# Patient Record
Sex: FEMALE | Race: OTHER | HISPANIC OR LATINO
[De-identification: names, ages, dates, MRNs, and addresses within clinical notes are randomized per-mention and may not be internally consistent; named-entity substitution may affect disease eponyms.]

---

## 2017-02-11 PROBLEM — Z00.00 ENCOUNTER FOR PREVENTIVE HEALTH EXAMINATION: Status: ACTIVE | Noted: 2017-02-11

## 2017-02-14 ENCOUNTER — LABORATORY RESULT (OUTPATIENT)
Age: 32
End: 2017-02-14

## 2017-02-15 ENCOUNTER — APPOINTMENT (OUTPATIENT)
Dept: OBGYN | Facility: CLINIC | Age: 32
End: 2017-02-15

## 2017-02-15 VITALS — DIASTOLIC BLOOD PRESSURE: 80 MMHG | WEIGHT: 146 LBS | SYSTOLIC BLOOD PRESSURE: 110 MMHG

## 2017-02-15 DIAGNOSIS — A63.0 ANOGENITAL (VENEREAL) WARTS: ICD-10-CM

## 2017-02-15 DIAGNOSIS — Z98.890 OTHER SPECIFIED POSTPROCEDURAL STATES: ICD-10-CM

## 2017-02-15 DIAGNOSIS — Z78.9 OTHER SPECIFIED HEALTH STATUS: ICD-10-CM

## 2017-02-15 DIAGNOSIS — Z80.0 FAMILY HISTORY OF MALIGNANT NEOPLASM OF DIGESTIVE ORGANS: ICD-10-CM

## 2017-03-15 ENCOUNTER — APPOINTMENT (OUTPATIENT)
Dept: OBGYN | Facility: CLINIC | Age: 32
End: 2017-03-15

## 2017-03-24 ENCOUNTER — APPOINTMENT (OUTPATIENT)
Dept: ANTEPARTUM | Facility: CLINIC | Age: 32
End: 2017-03-24

## 2017-03-24 ENCOUNTER — ASOB RESULT (OUTPATIENT)
Age: 32
End: 2017-03-24

## 2017-03-28 ENCOUNTER — FORM ENCOUNTER (OUTPATIENT)
Age: 32
End: 2017-03-28

## 2017-03-29 ENCOUNTER — OUTPATIENT (OUTPATIENT)
Dept: OUTPATIENT SERVICES | Facility: HOSPITAL | Age: 32
LOS: 1 days | End: 2017-03-29
Payer: MEDICAID

## 2017-03-29 DIAGNOSIS — Z34.80 ENCOUNTER FOR SUPERVISION OF OTHER NORMAL PREGNANCY, UNSPECIFIED TRIMESTER: ICD-10-CM

## 2017-03-29 LAB
APPEARANCE UR: CLEAR — SIGNIFICANT CHANGE UP
BILIRUB UR-MCNC: NEGATIVE — SIGNIFICANT CHANGE UP
COLOR SPEC: YELLOW — SIGNIFICANT CHANGE UP
DIFF PNL FLD: NEGATIVE — SIGNIFICANT CHANGE UP
GLUCOSE UR QL: NEGATIVE — SIGNIFICANT CHANGE UP
KETONES UR-MCNC: NEGATIVE — SIGNIFICANT CHANGE UP
LEUKOCYTE ESTERASE UR-ACNC: NEGATIVE — SIGNIFICANT CHANGE UP
NITRITE UR-MCNC: NEGATIVE — SIGNIFICANT CHANGE UP
PH UR: 6 — SIGNIFICANT CHANGE UP (ref 4.8–8)
PROT UR-MCNC: NEGATIVE — SIGNIFICANT CHANGE UP
SP GR SPEC: 1.01 — SIGNIFICANT CHANGE UP (ref 1.01–1.02)
UROBILINOGEN FLD QL: NEGATIVE — SIGNIFICANT CHANGE UP

## 2017-03-29 PROCEDURE — 59025 FETAL NON-STRESS TEST: CPT

## 2017-03-29 PROCEDURE — 76775 US EXAM ABDO BACK WALL LIM: CPT | Mod: 26

## 2017-03-29 PROCEDURE — 76775 US EXAM ABDO BACK WALL LIM: CPT

## 2017-03-29 PROCEDURE — 81003 URINALYSIS AUTO W/O SCOPE: CPT

## 2017-03-29 PROCEDURE — G0463: CPT

## 2017-03-29 PROCEDURE — 87086 URINE CULTURE/COLONY COUNT: CPT

## 2017-03-30 LAB
CULTURE RESULTS: NO GROWTH — SIGNIFICANT CHANGE UP
SPECIMEN SOURCE: SIGNIFICANT CHANGE UP

## 2017-04-12 ENCOUNTER — APPOINTMENT (OUTPATIENT)
Dept: OBGYN | Facility: CLINIC | Age: 32
End: 2017-04-12

## 2017-05-11 ENCOUNTER — APPOINTMENT (OUTPATIENT)
Dept: OBGYN | Facility: CLINIC | Age: 32
End: 2017-05-11

## 2017-05-11 VITALS
DIASTOLIC BLOOD PRESSURE: 80 MMHG | HEIGHT: 62 IN | WEIGHT: 160 LBS | SYSTOLIC BLOOD PRESSURE: 120 MMHG | BODY MASS INDEX: 29.44 KG/M2

## 2017-06-01 ENCOUNTER — APPOINTMENT (OUTPATIENT)
Dept: OBGYN | Facility: CLINIC | Age: 32
End: 2017-06-01

## 2017-06-01 VITALS
BODY MASS INDEX: 29.26 KG/M2 | WEIGHT: 159 LBS | HEIGHT: 62 IN | SYSTOLIC BLOOD PRESSURE: 100 MMHG | DIASTOLIC BLOOD PRESSURE: 70 MMHG

## 2017-06-01 DIAGNOSIS — L50.0 ALLERGIC URTICARIA: ICD-10-CM

## 2017-06-22 ENCOUNTER — APPOINTMENT (OUTPATIENT)
Dept: OBGYN | Facility: CLINIC | Age: 32
End: 2017-06-22

## 2017-06-22 VITALS
DIASTOLIC BLOOD PRESSURE: 80 MMHG | HEIGHT: 62 IN | WEIGHT: 165 LBS | SYSTOLIC BLOOD PRESSURE: 120 MMHG | BODY MASS INDEX: 30.36 KG/M2

## 2017-07-05 ENCOUNTER — LABORATORY RESULT (OUTPATIENT)
Age: 32
End: 2017-07-05

## 2017-07-05 ENCOUNTER — APPOINTMENT (OUTPATIENT)
Dept: OBGYN | Facility: CLINIC | Age: 32
End: 2017-07-05

## 2017-07-05 VITALS
SYSTOLIC BLOOD PRESSURE: 110 MMHG | DIASTOLIC BLOOD PRESSURE: 80 MMHG | WEIGHT: 164 LBS | HEIGHT: 62 IN | BODY MASS INDEX: 30.18 KG/M2

## 2017-07-12 ENCOUNTER — APPOINTMENT (OUTPATIENT)
Dept: OBGYN | Facility: CLINIC | Age: 32
End: 2017-07-12

## 2017-07-12 VITALS
HEIGHT: 62 IN | DIASTOLIC BLOOD PRESSURE: 80 MMHG | SYSTOLIC BLOOD PRESSURE: 120 MMHG | BODY MASS INDEX: 30.36 KG/M2 | WEIGHT: 165 LBS

## 2017-07-19 ENCOUNTER — APPOINTMENT (OUTPATIENT)
Dept: OBGYN | Facility: CLINIC | Age: 32
End: 2017-07-19

## 2017-07-19 ENCOUNTER — NON-APPOINTMENT (OUTPATIENT)
Age: 32
End: 2017-07-19

## 2017-07-19 VITALS
BODY MASS INDEX: 30.36 KG/M2 | HEIGHT: 62 IN | DIASTOLIC BLOOD PRESSURE: 80 MMHG | SYSTOLIC BLOOD PRESSURE: 100 MMHG | WEIGHT: 165 LBS

## 2017-07-26 ENCOUNTER — INPATIENT (INPATIENT)
Facility: HOSPITAL | Age: 32
LOS: 2 days | Discharge: HOME | End: 2017-07-29
Attending: OBSTETRICS & GYNECOLOGY | Admitting: OBSTETRICS & GYNECOLOGY
Payer: MEDICAID

## 2017-07-26 ENCOUNTER — APPOINTMENT (OUTPATIENT)
Dept: OBGYN | Facility: CLINIC | Age: 32
End: 2017-07-26

## 2017-07-26 VITALS — WEIGHT: 160.94 LBS | HEIGHT: 63 IN

## 2017-07-26 DIAGNOSIS — O26.899 OTHER SPECIFIED PREGNANCY RELATED CONDITIONS, UNSPECIFIED TRIMESTER: ICD-10-CM

## 2017-07-26 DIAGNOSIS — Z3A.00 WEEKS OF GESTATION OF PREGNANCY NOT SPECIFIED: ICD-10-CM

## 2017-07-26 DIAGNOSIS — Z34.80 ENCOUNTER FOR SUPERVISION OF OTHER NORMAL PREGNANCY, UNSPECIFIED TRIMESTER: ICD-10-CM

## 2017-07-26 LAB
APTT BLD: 27.4 SEC — LOW (ref 27.5–37.4)
BASOPHILS # BLD AUTO: 0.1 K/UL — SIGNIFICANT CHANGE UP (ref 0–0.2)
BASOPHILS NFR BLD AUTO: 0.7 % — SIGNIFICANT CHANGE UP (ref 0–2)
EOSINOPHIL # BLD AUTO: 0.1 K/UL — SIGNIFICANT CHANGE UP (ref 0–0.5)
EOSINOPHIL NFR BLD AUTO: 0.9 % — SIGNIFICANT CHANGE UP (ref 0–6)
FIBRINOGEN PPP-MCNC: 848 MG/DL — HIGH (ref 310–510)
HBV SURFACE AG SERPL QL IA: SIGNIFICANT CHANGE UP
HCT VFR BLD CALC: 36.2 % — SIGNIFICANT CHANGE UP (ref 34.5–45)
HGB BLD-MCNC: 12.6 G/DL — SIGNIFICANT CHANGE UP (ref 11.5–15.5)
HIV 1 & 2 AB SERPL IA.RAPID: SIGNIFICANT CHANGE UP
HIV 1+2 AB+HIV1 P24 AG SERPL QL IA: SIGNIFICANT CHANGE UP
INR BLD: 0.93 RATIO — SIGNIFICANT CHANGE UP (ref 0.88–1.16)
LYMPHOCYTES # BLD AUTO: 1.9 K/UL — SIGNIFICANT CHANGE UP (ref 1–3.3)
LYMPHOCYTES # BLD AUTO: 15.6 % — SIGNIFICANT CHANGE UP (ref 13–44)
MCHC RBC-ENTMCNC: 31.9 PG — SIGNIFICANT CHANGE UP (ref 27–34)
MCHC RBC-ENTMCNC: 34.9 GM/DL — SIGNIFICANT CHANGE UP (ref 32–36)
MCV RBC AUTO: 91.4 FL — SIGNIFICANT CHANGE UP (ref 80–100)
MONOCYTES # BLD AUTO: 1.1 K/UL — HIGH (ref 0–0.9)
MONOCYTES NFR BLD AUTO: 8.8 % — SIGNIFICANT CHANGE UP (ref 2–14)
NEUTROPHILS # BLD AUTO: 9.1 K/UL — HIGH (ref 1.8–7.4)
NEUTROPHILS NFR BLD AUTO: 74 % — SIGNIFICANT CHANGE UP (ref 43–77)
PLATELET # BLD AUTO: 229 K/UL — SIGNIFICANT CHANGE UP (ref 150–400)
PROTHROM AB SERPL-ACNC: 10.1 SEC — SIGNIFICANT CHANGE UP (ref 9.8–12.7)
RBC # BLD: 3.96 M/UL — SIGNIFICANT CHANGE UP (ref 3.8–5.2)
RBC # FLD: 13 % — SIGNIFICANT CHANGE UP (ref 10.3–14.5)
RUBV IGG SER-ACNC: 1.3 INDEX — SIGNIFICANT CHANGE UP
RUBV IGG SER-IMP: POSITIVE — SIGNIFICANT CHANGE UP
T PALLIDUM AB TITR SER: NEGATIVE — SIGNIFICANT CHANGE UP
WBC # BLD: 12.3 K/UL — HIGH (ref 3.8–10.5)
WBC # FLD AUTO: 12.3 K/UL — HIGH (ref 3.8–10.5)

## 2017-07-26 PROCEDURE — 59409 OBSTETRICAL CARE: CPT | Mod: U8,UB

## 2017-07-26 RX ORDER — ACETAMINOPHEN 500 MG
650 TABLET ORAL EVERY 6 HOURS
Qty: 0 | Refills: 0 | Status: DISCONTINUED | OUTPATIENT
Start: 2017-07-26 | End: 2017-07-29

## 2017-07-26 RX ORDER — GENTAMICIN SULFATE 40 MG/ML
80 VIAL (ML) INJECTION ONCE
Qty: 0 | Refills: 0 | Status: COMPLETED | OUTPATIENT
Start: 2017-07-26 | End: 2017-07-26

## 2017-07-26 RX ORDER — OXYTOCIN 10 UNIT/ML
333.33 VIAL (ML) INJECTION
Qty: 20 | Refills: 0 | Status: DISCONTINUED | OUTPATIENT
Start: 2017-07-26 | End: 2017-07-26

## 2017-07-26 RX ORDER — AMPICILLIN TRIHYDRATE 250 MG
2 CAPSULE ORAL ONCE
Qty: 0 | Refills: 0 | Status: COMPLETED | OUTPATIENT
Start: 2017-07-26 | End: 2017-07-26

## 2017-07-26 RX ORDER — GENTAMICIN SULFATE 40 MG/ML
80 VIAL (ML) INJECTION EVERY 8 HOURS
Qty: 0 | Refills: 0 | Status: DISCONTINUED | OUTPATIENT
Start: 2017-07-27 | End: 2017-07-27

## 2017-07-26 RX ORDER — SODIUM CHLORIDE 9 MG/ML
1000 INJECTION, SOLUTION INTRAVENOUS
Qty: 0 | Refills: 0 | Status: DISCONTINUED | OUTPATIENT
Start: 2017-07-26 | End: 2017-07-27

## 2017-07-26 RX ORDER — SODIUM CHLORIDE 9 MG/ML
1000 INJECTION, SOLUTION INTRAVENOUS ONCE
Qty: 0 | Refills: 0 | Status: COMPLETED | OUTPATIENT
Start: 2017-07-26 | End: 2017-07-26

## 2017-07-26 RX ORDER — SODIUM CHLORIDE 9 MG/ML
125 INJECTION, SOLUTION INTRAVENOUS ONCE
Qty: 0 | Refills: 0 | Status: DISCONTINUED | OUTPATIENT
Start: 2017-07-26 | End: 2017-07-26

## 2017-07-26 RX ORDER — CITRIC ACID/SODIUM CITRATE 300-500 MG
15 SOLUTION, ORAL ORAL EVERY 4 HOURS
Qty: 0 | Refills: 0 | Status: DISCONTINUED | OUTPATIENT
Start: 2017-07-26 | End: 2017-07-26

## 2017-07-26 RX ORDER — AMPICILLIN TRIHYDRATE 250 MG
CAPSULE ORAL
Qty: 0 | Refills: 0 | Status: DISCONTINUED | OUTPATIENT
Start: 2017-07-26 | End: 2017-07-27

## 2017-07-26 RX ORDER — AMPICILLIN TRIHYDRATE 250 MG
2 CAPSULE ORAL EVERY 6 HOURS
Qty: 0 | Refills: 0 | Status: DISCONTINUED | OUTPATIENT
Start: 2017-07-27 | End: 2017-07-27

## 2017-07-26 RX ORDER — GENTAMICIN SULFATE 40 MG/ML
VIAL (ML) INJECTION
Qty: 0 | Refills: 0 | Status: DISCONTINUED | OUTPATIENT
Start: 2017-07-26 | End: 2017-07-27

## 2017-07-26 RX ORDER — OXYTOCIN 10 UNIT/ML
2 VIAL (ML) INJECTION
Qty: 30 | Refills: 0 | Status: DISCONTINUED | OUTPATIENT
Start: 2017-07-26 | End: 2017-07-27

## 2017-07-26 RX ADMIN — SODIUM CHLORIDE 125 MILLILITER(S): 9 INJECTION, SOLUTION INTRAVENOUS at 12:01

## 2017-07-26 RX ADMIN — SODIUM CHLORIDE 1000 MILLILITER(S): 9 INJECTION, SOLUTION INTRAVENOUS at 20:15

## 2017-07-26 RX ADMIN — Medication 200 MILLIGRAM(S): at 20:50

## 2017-07-26 RX ADMIN — SODIUM CHLORIDE 2000 MILLILITER(S): 9 INJECTION, SOLUTION INTRAVENOUS at 10:46

## 2017-07-26 RX ADMIN — Medication 216 GRAM(S): at 20:15

## 2017-07-26 RX ADMIN — Medication 2 MILLIUNIT(S)/MIN: at 17:59

## 2017-07-26 RX ADMIN — SODIUM CHLORIDE 125 MILLILITER(S): 9 INJECTION, SOLUTION INTRAVENOUS at 02:11

## 2017-07-26 RX ADMIN — Medication 650 MILLIGRAM(S): at 20:15

## 2017-07-26 NOTE — PATIENT PROFILE OB - AS DELIV COMPLICATIONS OB
premature rupture of membranes prior to labor premature rupture of membranes prior to labor/maternal fever

## 2017-07-26 NOTE — PATIENT PROFILE OB - ABILITY TO HEAR (WITH HEARING AID OR HEARING APPLIANCE IF NORMALLY USED):
"    [facility] [place] - OB/ GYN  [address]  [address]  [place] [address]  Phone: [phone]                  Elmo Law   3/7/2017 3:40 PM   Office Visit    Description:  Female : 1983   Provider:  Ross Raymundo MD   Department:  [facility] [place] - OB/ GYN           Reason for Visit     Gynecologic Exam                To Do List           Goals (5 Years of Data)     None      [facility]     [facility] -  Assistance  Registered nurses in the [facility] provide clinical advisement, health education, appointment booking, and other advisory services.  Call for this free service at [phone].             Medications           Message regarding Medications     Verify the changes and/or additions to your medication regime listed below are the same as discussed with your clinician today.  If any of these changes or additions are incorrect, please notify your healthcare provider.             Verify that the below list of medications is an accurate representation of the medications you are currently taking.  If none reported, the list may be blank. If incorrect, please contact your healthcare provider. Carry this list with you in case of emergency.           Current Medications     fluticasone (FLONASE) 50 mcg/actuation nasal spray One to two sprays each nostril daily    loratadine (CLARITIN) 10 mg tablet Take 1 tablet (10 mg total) by mouth once daily.    norgestimate-ethinyl estradiol (ORTHO TRI-CYCLEN LO, 28,) 0.18/0.215 mg/ 0.25 mg-25 mcg tablet Take 1 tablet by mouth once daily.           Clinical Reference Information           Your Vitals Were     BP Temp Height    122/62 (BP Location: Right arm, Patient Position: Sitting, BP Method: Manual) 98.6 °F (37 °C) (Oral) 5' 3" (1.6 m)    Weight Last Period BMI    54.4 kg (119 lb 14.9 oz) 2017 (Exact Date) 21.24 kg/m2      Blood Pressure          Most Recent Value    BP  122/62      Allergies as of 3/7/2017     " No Known Allergies      Immunizations Administered on Date of Encounter - 3/7/2017     None      Language Assistance Services     ATTENTION: Language assistance services are available, free of charge. Please call [phone].      ATENCIÓN: Si obed najera, tiene a sood disposición servicios gratuitos de asistencia lingüística. Llame al [phone].     CHÚ Ý: N?u b?n nói Ti?ng Vi?t, có các d?ch v? h? tr? ngôn ng? mi?n phí dành cho b?n. G?i s? [phone].         [facility] - OB/ GYN complies with applicable Federal civil rights laws and does not discriminate on the basis of race, color, national origin, age, disability, or sex.         Adequate: hears normal conversation without difficulty

## 2017-07-27 DIAGNOSIS — R26.81 UNSTEADINESS ON FEET: ICD-10-CM

## 2017-07-27 LAB
BASOPHILS # BLD AUTO: 0.1 K/UL — SIGNIFICANT CHANGE UP (ref 0–0.2)
BASOPHILS NFR BLD AUTO: 0.5 % — SIGNIFICANT CHANGE UP (ref 0–2)
EOSINOPHIL # BLD AUTO: 0.2 K/UL — SIGNIFICANT CHANGE UP (ref 0–0.5)
EOSINOPHIL NFR BLD AUTO: 0.9 % — SIGNIFICANT CHANGE UP (ref 0–6)
HCT VFR BLD CALC: 32.1 % — LOW (ref 34.5–45)
HGB BLD-MCNC: 10.7 G/DL — LOW (ref 11.5–15.5)
LYMPHOCYTES # BLD AUTO: 1.9 K/UL — SIGNIFICANT CHANGE UP (ref 1–3.3)
LYMPHOCYTES # BLD AUTO: 10.3 % — LOW (ref 13–44)
MCHC RBC-ENTMCNC: 31.3 PG — SIGNIFICANT CHANGE UP (ref 27–34)
MCHC RBC-ENTMCNC: 33.4 GM/DL — SIGNIFICANT CHANGE UP (ref 32–36)
MCV RBC AUTO: 93.5 FL — SIGNIFICANT CHANGE UP (ref 80–100)
MONOCYTES # BLD AUTO: 1.4 K/UL — HIGH (ref 0–0.9)
MONOCYTES NFR BLD AUTO: 7.9 % — SIGNIFICANT CHANGE UP (ref 2–14)
NEUTROPHILS # BLD AUTO: 14.6 K/UL — HIGH (ref 1.8–7.4)
NEUTROPHILS NFR BLD AUTO: 80.4 % — HIGH (ref 43–77)
PLATELET # BLD AUTO: 188 K/UL — SIGNIFICANT CHANGE UP (ref 150–400)
RBC # BLD: 3.43 M/UL — LOW (ref 3.8–5.2)
RBC # FLD: 13.2 % — SIGNIFICANT CHANGE UP (ref 10.3–14.5)
WBC # BLD: 18.2 K/UL — HIGH (ref 3.8–10.5)
WBC # FLD AUTO: 18.2 K/UL — HIGH (ref 3.8–10.5)

## 2017-07-27 PROCEDURE — 72190 X-RAY EXAM OF PELVIS: CPT | Mod: 26

## 2017-07-27 RX ORDER — TETANUS TOXOID, REDUCED DIPHTHERIA TOXOID AND ACELLULAR PERTUSSIS VACCINE, ADSORBED 5; 2.5; 8; 8; 2.5 [IU]/.5ML; [IU]/.5ML; UG/.5ML; UG/.5ML; UG/.5ML
0.5 SUSPENSION INTRAMUSCULAR ONCE
Qty: 0 | Refills: 0 | Status: DISCONTINUED | OUTPATIENT
Start: 2017-07-27 | End: 2017-07-29

## 2017-07-27 RX ORDER — AER TRAVELER 0.5 G/1
1 SOLUTION RECTAL; TOPICAL EVERY 4 HOURS
Qty: 0 | Refills: 0 | Status: DISCONTINUED | OUTPATIENT
Start: 2017-07-27 | End: 2017-07-29

## 2017-07-27 RX ORDER — PRAMOXINE HYDROCHLORIDE 150 MG/15G
1 AEROSOL, FOAM RECTAL EVERY 4 HOURS
Qty: 0 | Refills: 0 | Status: DISCONTINUED | OUTPATIENT
Start: 2017-07-27 | End: 2017-07-29

## 2017-07-27 RX ORDER — LANOLIN
1 OINTMENT (GRAM) TOPICAL EVERY 6 HOURS
Qty: 0 | Refills: 0 | Status: DISCONTINUED | OUTPATIENT
Start: 2017-07-27 | End: 2017-07-29

## 2017-07-27 RX ORDER — OXYTOCIN 10 UNIT/ML
41.67 VIAL (ML) INJECTION
Qty: 20 | Refills: 0 | Status: DISCONTINUED | OUTPATIENT
Start: 2017-07-27 | End: 2017-07-29

## 2017-07-27 RX ORDER — OXYCODONE AND ACETAMINOPHEN 5; 325 MG/1; MG/1
2 TABLET ORAL EVERY 6 HOURS
Qty: 0 | Refills: 0 | Status: DISCONTINUED | OUTPATIENT
Start: 2017-07-27 | End: 2017-07-29

## 2017-07-27 RX ORDER — DIBUCAINE 1 %
1 OINTMENT (GRAM) RECTAL EVERY 4 HOURS
Qty: 0 | Refills: 0 | Status: DISCONTINUED | OUTPATIENT
Start: 2017-07-27 | End: 2017-07-29

## 2017-07-27 RX ORDER — HYDROCORTISONE 1 %
1 OINTMENT (GRAM) TOPICAL EVERY 4 HOURS
Qty: 0 | Refills: 0 | Status: DISCONTINUED | OUTPATIENT
Start: 2017-07-27 | End: 2017-07-29

## 2017-07-27 RX ORDER — GLYCERIN ADULT
1 SUPPOSITORY, RECTAL RECTAL AT BEDTIME
Qty: 0 | Refills: 0 | Status: DISCONTINUED | OUTPATIENT
Start: 2017-07-27 | End: 2017-07-29

## 2017-07-27 RX ORDER — BENZOCAINE 10 %
1 GEL (GRAM) MUCOUS MEMBRANE EVERY 6 HOURS
Qty: 0 | Refills: 0 | Status: DISCONTINUED | OUTPATIENT
Start: 2017-07-27 | End: 2017-07-29

## 2017-07-27 RX ORDER — OXYCODONE AND ACETAMINOPHEN 5; 325 MG/1; MG/1
1 TABLET ORAL
Qty: 0 | Refills: 0 | Status: DISCONTINUED | OUTPATIENT
Start: 2017-07-27 | End: 2017-07-29

## 2017-07-27 RX ORDER — SIMETHICONE 80 MG/1
80 TABLET, CHEWABLE ORAL EVERY 6 HOURS
Qty: 0 | Refills: 0 | Status: DISCONTINUED | OUTPATIENT
Start: 2017-07-27 | End: 2017-07-29

## 2017-07-27 RX ORDER — SODIUM CHLORIDE 9 MG/ML
3 INJECTION INTRAMUSCULAR; INTRAVENOUS; SUBCUTANEOUS EVERY 8 HOURS
Qty: 0 | Refills: 0 | Status: DISCONTINUED | OUTPATIENT
Start: 2017-07-27 | End: 2017-07-29

## 2017-07-27 RX ORDER — MAGNESIUM HYDROXIDE 400 MG/1
30 TABLET, CHEWABLE ORAL
Qty: 0 | Refills: 0 | Status: DISCONTINUED | OUTPATIENT
Start: 2017-07-27 | End: 2017-07-29

## 2017-07-27 RX ORDER — DOCUSATE SODIUM 100 MG
100 CAPSULE ORAL
Qty: 0 | Refills: 0 | Status: DISCONTINUED | OUTPATIENT
Start: 2017-07-27 | End: 2017-07-29

## 2017-07-27 RX ORDER — IBUPROFEN 200 MG
600 TABLET ORAL EVERY 6 HOURS
Qty: 0 | Refills: 0 | Status: DISCONTINUED | OUTPATIENT
Start: 2017-07-27 | End: 2017-07-29

## 2017-07-27 RX ORDER — ACETAMINOPHEN 500 MG
650 TABLET ORAL EVERY 6 HOURS
Qty: 0 | Refills: 0 | Status: DISCONTINUED | OUTPATIENT
Start: 2017-07-27 | End: 2017-07-29

## 2017-07-27 RX ORDER — DIPHENHYDRAMINE HCL 50 MG
25 CAPSULE ORAL EVERY 6 HOURS
Qty: 0 | Refills: 0 | Status: DISCONTINUED | OUTPATIENT
Start: 2017-07-27 | End: 2017-07-29

## 2017-07-27 RX ADMIN — Medication 600 MILLIGRAM(S): at 08:44

## 2017-07-27 RX ADMIN — Medication 600 MILLIGRAM(S): at 17:07

## 2017-07-27 RX ADMIN — Medication 125 MILLIUNIT(S)/MIN: at 02:18

## 2017-07-27 RX ADMIN — Medication 600 MILLIGRAM(S): at 17:30

## 2017-07-27 RX ADMIN — SODIUM CHLORIDE 3 MILLILITER(S): 9 INJECTION INTRAMUSCULAR; INTRAVENOUS; SUBCUTANEOUS at 14:17

## 2017-07-27 RX ADMIN — SODIUM CHLORIDE 3 MILLILITER(S): 9 INJECTION INTRAMUSCULAR; INTRAVENOUS; SUBCUTANEOUS at 06:29

## 2017-07-27 RX ADMIN — Medication 1 TABLET(S): at 12:17

## 2017-07-27 RX ADMIN — Medication 200 MILLIGRAM(S): at 05:30

## 2017-07-27 RX ADMIN — Medication 100 MILLIGRAM(S): at 12:17

## 2017-07-27 RX ADMIN — SODIUM CHLORIDE 3 MILLILITER(S): 9 INJECTION INTRAMUSCULAR; INTRAVENOUS; SUBCUTANEOUS at 22:00

## 2017-07-27 RX ADMIN — Medication 650 MILLIGRAM(S): at 00:19

## 2017-07-27 RX ADMIN — Medication 216 GRAM(S): at 08:42

## 2017-07-27 RX ADMIN — Medication 600 MILLIGRAM(S): at 09:15

## 2017-07-27 RX ADMIN — Medication 216 GRAM(S): at 02:17

## 2017-07-27 NOTE — PROGRESS NOTE ADULT - ASSESSMENT
A:  31 y/o  PPD 0 s/p vaginal delivery, c/b PROM > 24 hours, prolonged labor, with post partum fever, now with unsteady gait  #Postpartum fever    - Ampicillin/Gentamcyin one more dose this am, d/c abx afterwards   - f.u cbc for wbc    - q4h vital signs for temp    - tylenol prn for fever    #Unstead gait   - Pelvic x-ray to rule out pubic symphisis diastasis, or fracture   - ortho consult as needed   - physical therapy consult, for assistive equipment as needed    #vaginal delivery    - Continue post partum care   - Encourage breastfeeding   - Oob to chair, with assistance   - shower daily     d/w Dr. Waterman

## 2017-07-27 NOTE — PROGRESS NOTE ADULT - SUBJECTIVE AND OBJECTIVE BOX
PPD 0  s/p vaginal delivery. Patient seen at bedside, reports 8/10 left lower back and left hip/pelvic pain. She describes the pain as a dull, ache that worsens with movement of left lower extremity. She states that the pain also started soon after her delivery. Subsequently, she states that she is not able to walk, and baby is not at bedside. Patient is voiding without issues, passing gas but has not had a BM, tolerating regular diet. She denies: fever chills, N/V, CP,  heavier vaginal bleeding, SOB, cough, previous history of venous thromboembolism and/or hematological hypercoagulable states.     O: General Survey: a&ox3 and able to answer questions fully. She is lying down on her hospital bed, and not in acute distress  Vital Signs Last 24 Hrs  T(C): 36.4 (2017 07:30), Max: 37 (2017 01:31)  T(F): 97.5 (2017 07:30), Max: 98.6 (2017 01:31)  HR: 80 (2017 07:30) (77 - 101)  BP: 105/60 (2017 07:30) (92/51 - 119/60)  BP(mean): --  RR: 14 (2017 07:30) (14 - 18)  SpO2: 98% (2017 07:30) (95% - 98%)  CV: S1/S2 intact, no murmurs, gallops or friction rubs  Pulm: CTA bilaterally, no rales, rhonchi or wheezing   Abdominal: Fundus was able to be palpated below the level of umbilicus, + BS were present in all four quadrants, and was non-tender to palpation.   GYN: lochia wnl  MSK: left lower extremity has decreased active and passive ROM especially on flexion and extension at the level of the hip. In addition, left hip unable to lift against gravity.  On the contralateral side, the right hip has full ROM, and 5/5 strength. There is a + straight leg raise on left side, while negative on the contralateral  right side. 2+DTR present bilaterally in both lower extremities. Pulses are intact 2+ bilaterally at DP, TP, and popliteal.                           12.6   12.3  )-----------( 229      ( 2017 02:14 )             36.2       RADIOLOGY PENDING  PPD 0  s/p vaginal delivery. Patient seen at bedside, reports 8/10 left lower back and left hip/pelvic pain. She describes the pain as a dull, ache that worsens with movement of left lower extremity. She states that the pain also started soon after her delivery. Subsequently, she states that she is not able to walk, and baby is not at bedside. Patient is voiding without issues, passing gas but has not had a BM, tolerating regular diet. She denies: fever chills, N/V, CP,  heavier vaginal bleeding, SOB, cough, previous history of venous thromboembolism and/or hematological hypercoagulable states.     O: General Survey: a&ox3 and able to answer questions fully. She is lying down on her hospital bed, and not in acute distress  Vital Signs Last 24 Hrs  T(C): 36.4 (2017 07:30), Max: 37 (2017 01:31)  T(F): 97.5 (2017 07:30), Max: 98.6 (2017 01:31)  HR: 80 (2017 07:30) (77 - 101)  BP: 105/60 (2017 07:30) (92/51 - 119/60)  RR: 14 (2017 07:30) (14 - 18)  SpO2: 98% (2017 07:30) (95% - 98%)    CV: S1/S2 intact, no murmurs, gallops or friction rubs  Pulm: CTA bilaterally, no rales, rhonchi or wheezing   Abdominal: Fundus was able to be palpated below the level of umbilicus, + BS were present in all four quadrants, and was non-tender to palpation.   GYN: lochia wnl  MSK: left lower extremity has decreased active and passive ROM especially on flexion and extension at the level of the hip. In addition, left hip strength 2/5.  On the contralateral side, the right hip has full ROM, and 5/5 strength. There is a + straight leg raise on left side, while negative on the contralateral  right side. 2+DTR present bilaterally in both lower extremities. Pulses are intact 2+ bilaterally at DP, TP, and popliteal.                           12.6   12.3  )-----------( 229      ( 2017 02:14 )             36.2       RADIOLOGY PENDING

## 2017-07-27 NOTE — CHART NOTE - NSCHARTNOTEFT_GEN_A_CORE
Patient seen at bedside, states the pain is better this afternoon, states she feels the pain is related to her episiotomy/repair site. Feels relief with dermoplast    Pelvic x-ray findings:    PROCEDURE DATE:  07/27/2017      INTERPRETATION:  CLINICAL STATEMENT: Status post vaginal delivery. Rule   out pubic symphysis diastasis    TECHNIQUE: AP view of the pelvis.    COMPARISON: None.    FINDINGS:  There is no acute fracture or dislocation. The pubic symphysis measures 4 mm in width    IMPRESSION:  No evidence of pubic symphysis diastasis.     REMEDIOS ANDERSON M.D., ATTENDING RADIOLOGIST  This document has been electronically signed. Jul 27 2017 10:56AM      will continue to observe gait, and will follow up as needed.

## 2017-07-28 ENCOUNTER — TRANSCRIPTION ENCOUNTER (OUTPATIENT)
Age: 32
End: 2017-07-28

## 2017-07-28 RX ORDER — IBUPROFEN 200 MG
1 TABLET ORAL
Qty: 30 | Refills: 0 | OUTPATIENT
Start: 2017-07-28

## 2017-07-28 RX ORDER — DOCUSATE SODIUM 100 MG
1 CAPSULE ORAL
Qty: 30 | Refills: 0 | OUTPATIENT
Start: 2017-07-28

## 2017-07-28 RX ADMIN — Medication 600 MILLIGRAM(S): at 21:00

## 2017-07-28 RX ADMIN — Medication 600 MILLIGRAM(S): at 14:48

## 2017-07-28 RX ADMIN — Medication 600 MILLIGRAM(S): at 04:08

## 2017-07-28 RX ADMIN — SODIUM CHLORIDE 3 MILLILITER(S): 9 INJECTION INTRAMUSCULAR; INTRAVENOUS; SUBCUTANEOUS at 14:47

## 2017-07-28 RX ADMIN — Medication 600 MILLIGRAM(S): at 04:40

## 2017-07-28 RX ADMIN — SODIUM CHLORIDE 3 MILLILITER(S): 9 INJECTION INTRAMUSCULAR; INTRAVENOUS; SUBCUTANEOUS at 06:43

## 2017-07-28 RX ADMIN — Medication 600 MILLIGRAM(S): at 15:30

## 2017-07-28 RX ADMIN — Medication 1 TABLET(S): at 11:49

## 2017-07-28 RX ADMIN — SODIUM CHLORIDE 3 MILLILITER(S): 9 INJECTION INTRAMUSCULAR; INTRAVENOUS; SUBCUTANEOUS at 22:47

## 2017-07-28 RX ADMIN — Medication 600 MILLIGRAM(S): at 20:05

## 2017-07-28 NOTE — PROGRESS NOTE ADULT - ASSESSMENT
A/P:  Postpartum day one in stable condition, no evidence of pubic symphysis diastasis, x-ray within normal limits.  -Continue pain management  -Encourage OOB, breast feeding and ambulation  -Continue current care  -Plan for discharge tomorrow.     Attending aware

## 2017-07-28 NOTE — DISCHARGE NOTE OB - CARE PLAN
Principal Discharge DX:	Vaginal delivery  Goal:	Pain management and breastfeeding  Instructions for follow-up, activity and diet:	Continue breastfeeding.  Motrin as needed for pain.  Nothing per vagina x 6 weeks - no sex, tampons, douching, tub baths, etc.  Follow up in office in 5-6 weeks for check up Principal Discharge DX:	Vaginal delivery  Goal:	ob check in 4-6weeks  Instructions for follow-up, activity and diet:	no sex nothing in vagina no heavy lifting no pushing no straining no strenuous activities  pain medication as needed; stool softener; dulcolax as needed if constipated  walk for exercise: helps recovery   continue prenatal vitamins daily especially whole course of breastfeeding  see your OB in the office for follow up post partum check 4-5weeks call the office to set up an appointment

## 2017-07-28 NOTE — DISCHARGE NOTE OB - PLAN OF CARE
Pain management and breastfeeding Continue breastfeeding.  Motrin as needed for pain.  Nothing per vagina x 6 weeks - no sex, tampons, douching, tub baths, etc.  Follow up in office in 5-6 weeks for check up ob check in 4-6weeks no sex nothing in vagina no heavy lifting no pushing no straining no strenuous activities  pain medication as needed; stool softener; dulcolax as needed if constipated  walk for exercise: helps recovery   continue prenatal vitamins daily especially whole course of breastfeeding  see your OB in the office for follow up post partum check 4-5weeks call the office to set up an appointment

## 2017-07-28 NOTE — DISCHARGE NOTE OB - PATIENT PORTAL LINK FT
“You can access the FollowHealth Patient Portal, offered by [facility], by registering with the following website: [email or web address]”

## 2017-07-28 NOTE — DISCHARGE NOTE OB - MEDICATION SUMMARY - MEDICATIONS TO TAKE
I will START or STAY ON the medications listed below when I get home from the hospital:    ibuprofen 600 mg oral tablet  -- 1 tab(s) by mouth every 6 hours  -- Do not take this drug if you are pregnant.  It is very important that you take or use this exactly as directed.  Do not skip doses or discontinue unless directed by your doctor.  May cause drowsiness or dizziness.  Obtain medical advice before taking any non-prescription drugs as some may affect the action of this medication.  Take with food or milk.    -- Indication: For as needed for pain    Prenatal Multivitamins with Folic Acid 1 mg oral tablet  -- 1 tab(s) by mouth once a day  -- Indication: For breastfeeding    Colace sodium 100 mg oral capsule  -- 1 cap(s) by mouth 2 times a day  -- Medication should be taken with plenty of water.    -- Indication: For Stool softener/constipation

## 2017-07-28 NOTE — PROGRESS NOTE ADULT - SUBJECTIVE AND OBJECTIVE BOX
Patient seen resting comfortably.  No current complaints. States that she no longer has instability when walking, and reports pain is better today compared to yesterday. Denies HA, CP, SOB, N/V/D, dizziness, palpitations, worsening abdominal pain, worsening vaginal bleeding, or any other concerns.  Ambulating and voiding without difficulty.  Passing flatus and tolerating regular diet. Baby is in level II nursery, attempting to use breast pump.     Vital Signs Last 24 Hrs  T(C): 36.6 (28 Jul 2017 06:45), Max: 36.9 (27 Jul 2017 20:04)  T(F): 97.8 (28 Jul 2017 06:45), Max: 98.4 (27 Jul 2017 20:04)  HR: 76 (28 Jul 2017 06:45) (76 - 81)  BP: 105/60 (28 Jul 2017 06:45) (99/58 - 105/60)  BP(mean): --  RR: 16 (28 Jul 2017 06:45) (16 - 16)  SpO2: 98% (28 Jul 2017 06:45) (98% - 99%)    Physical Exam:     Gen: A&Ox 3, NAD  Chest: CTABL  Cardiac: S1+S2+ RRR  Breast: Soft, nontender, nonengorged  Abdomen: Soft, nontender, Fundus firm below umbilicus, +BS  Gyn: Mild lochia, intact/repaired  Extremities: Nontender, DTRS 2+, no worsening edema                          10.7   18.2  )-----------( 188      ( 27 Jul 2017 18:11 )             32.1

## 2017-07-28 NOTE — DISCHARGE NOTE OB - CARE PROVIDER_API CALL
Gil Waterman), Obstetrics and Gynecology  [address] [place], [place] [address]  Phone: [phone]  Fax: [phone]

## 2017-07-28 NOTE — DISCHARGE NOTE OB - MATERIALS PROVIDED
Birth Certificate Instructions/Back To Sleep Handout/Vaccinations/Shaken Baby Prevention Handout/Guide to Postpartum Care/Breastfeeding Log/  Immunization Record/Breastfeeding Guide and Packet/[facility] Screening Program/Breastfeeding Mother’s Support Group Information

## 2017-07-28 NOTE — DISCHARGE NOTE OB - HOSPITAL COURSE
Patient is a 32 year old  at 37w5d who presented with ruptured membranes. S/p  and uncomplicated postpartum care.

## 2017-07-29 VITALS
RESPIRATION RATE: 15 BRPM | OXYGEN SATURATION: 100 % | HEART RATE: 93 BPM | DIASTOLIC BLOOD PRESSURE: 62 MMHG | TEMPERATURE: 98 F | SYSTOLIC BLOOD PRESSURE: 90 MMHG

## 2017-07-29 PROCEDURE — 72190 X-RAY EXAM OF PELVIS: CPT

## 2017-07-29 PROCEDURE — 87389 HIV-1 AG W/HIV-1&-2 AB AG IA: CPT

## 2017-07-29 PROCEDURE — 85730 THROMBOPLASTIN TIME PARTIAL: CPT

## 2017-07-29 PROCEDURE — 86703 HIV-1/HIV-2 1 RESULT ANTBDY: CPT

## 2017-07-29 PROCEDURE — 85384 FIBRINOGEN ACTIVITY: CPT

## 2017-07-29 PROCEDURE — 86780 TREPONEMA PALLIDUM: CPT

## 2017-07-29 PROCEDURE — 86850 RBC ANTIBODY SCREEN: CPT

## 2017-07-29 PROCEDURE — 59025 FETAL NON-STRESS TEST: CPT

## 2017-07-29 PROCEDURE — 36415 COLL VENOUS BLD VENIPUNCTURE: CPT

## 2017-07-29 PROCEDURE — 86900 BLOOD TYPING SEROLOGIC ABO: CPT

## 2017-07-29 PROCEDURE — 59050 FETAL MONITOR W/REPORT: CPT

## 2017-07-29 PROCEDURE — G0463: CPT

## 2017-07-29 PROCEDURE — 86762 RUBELLA ANTIBODY: CPT

## 2017-07-29 PROCEDURE — 87340 HEPATITIS B SURFACE AG IA: CPT

## 2017-07-29 PROCEDURE — 86901 BLOOD TYPING SEROLOGIC RH(D): CPT

## 2017-07-29 PROCEDURE — 85610 PROTHROMBIN TIME: CPT

## 2017-07-29 PROCEDURE — 85027 COMPLETE CBC AUTOMATED: CPT

## 2017-07-29 RX ADMIN — Medication 600 MILLIGRAM(S): at 07:50

## 2017-07-29 RX ADMIN — Medication 600 MILLIGRAM(S): at 06:52

## 2017-07-29 RX ADMIN — OXYCODONE AND ACETAMINOPHEN 1 TABLET(S): 5; 325 TABLET ORAL at 00:57

## 2017-07-29 RX ADMIN — SODIUM CHLORIDE 3 MILLILITER(S): 9 INJECTION INTRAMUSCULAR; INTRAVENOUS; SUBCUTANEOUS at 06:39

## 2017-07-29 RX ADMIN — OXYCODONE AND ACETAMINOPHEN 1 TABLET(S): 5; 325 TABLET ORAL at 01:53

## 2017-07-29 NOTE — PROGRESS NOTE ADULT - SUBJECTIVE AND OBJECTIVE BOX
PA NOTE:  PPD#2 FtNSVD   at bedside  doing well    Patient seen at bedside resting comfortably offers no current complaints.  Ambulating and voiding without difficulty.  Passing flatus and tolerating regular diet.  both breast/bottle feeding.  Denies HA, CP, SOB, N/V/D, dizziness, palpitations, worsening abdominal pain, worsening vaginal bleeding, or any other concerns.      Vital Signs Last 24 Hrs  T(C): 36.8 (2017 04:17), Max: 36.8 (2017 04:17)  T(F): 98.3 (2017 04:17), Max: 98.3 (2017 04:17)  HR: 93 (2017 04:17) (71 - 96)  BP: 90/62 (2017 04:17) (90/62 - 128/83)  BP(mean): --  RR: 15 (2017 04:17) (15 - 16)  SpO2: 100% (2017 04:17) (99% - 100%)    Physical Exam:     Gen: A&Ox 3, NAD  Chest: CTA B/L  Cardiac: S1+S2; RRR  Breast: Soft, nontender, nonengorged  Abdomen: +BS; Soft, nontender, ND; Fundus firm below umbilicus  Gyn: Min lochia  Ext: Nontender, DTRS 2+, no worsening edema     CBC Full  -  ( 2017 18:11 )  WBC Count : 18.2 K/uL  Hemoglobin : 10.7 g/dL  Hematocrit : 32.1 %  Platelet Count - Automated : 188 K/uL  Mean Cell Volume : 93.5 fl  Mean Cell Hemoglobin : 31.3 pg  Mean Cell Hemoglobin Concentration : 33.4 gm/dL  Auto Neutrophil # : 14.6 K/uL  Auto Lymphocyte # : 1.9 K/uL  Auto Monocyte # : 1.4 K/uL  Auto Eosinophil # : 0.2 K/uL  Auto Basophil # : 0.1 K/uL  Auto Neutrophil % : 80.4 %  Auto Lymphocyte % : 10.3 %  Auto Monocyte % : 7.9 %  Auto Eosinophil % : 0.9 %  Auto Basophil % : 0.5 %      PA NOTE:  PPD#2 FtNSVD   at bedside  doing well  -dc home today dw dr fowler  -erx sent  -ob check in 4-5weeks

## 2017-07-29 NOTE — PROGRESS NOTE ADULT - PROBLEM SELECTOR PLAN 1
PA NOTE:  PPD#2 FtNSVD   at bedside  doing well  -dc home today dw dr fowelr  -erx sent  -ob check in 4-5weeks

## 2017-07-29 NOTE — PROGRESS NOTE ADULT - ASSESSMENT
PA NOTE:  PPD#2 FtNSVD   at bedside  doing well  -dc home today dw dr fowler  -erx sent  -ob check in 4-5weeks

## 2017-08-01 DIAGNOSIS — R26.81 UNSTEADINESS ON FEET: ICD-10-CM

## 2017-08-01 DIAGNOSIS — O42.92 FULL-TERM PREMATURE RUPTURE OF MEMBRANES, UNSPECIFIED AS TO LENGTH OF TIME BETWEEN RUPTURE AND ONSET OF LABOR: ICD-10-CM

## 2017-08-01 DIAGNOSIS — Z3A.37 37 WEEKS GESTATION OF PREGNANCY: ICD-10-CM

## 2017-08-01 DIAGNOSIS — Z28.21 IMMUNIZATION NOT CARRIED OUT BECAUSE OF PATIENT REFUSAL: ICD-10-CM

## 2017-09-01 ENCOUNTER — APPOINTMENT (OUTPATIENT)
Dept: OBGYN | Facility: CLINIC | Age: 32
End: 2017-09-01
Payer: MEDICAID

## 2017-09-01 VITALS
WEIGHT: 146 LBS | SYSTOLIC BLOOD PRESSURE: 110 MMHG | DIASTOLIC BLOOD PRESSURE: 80 MMHG | BODY MASS INDEX: 26.87 KG/M2 | HEIGHT: 62 IN

## 2017-09-01 PROCEDURE — 99213 OFFICE O/P EST LOW 20 MIN: CPT

## 2017-09-20 ENCOUNTER — APPOINTMENT (OUTPATIENT)
Dept: OBGYN | Facility: CLINIC | Age: 32
End: 2017-09-20
Payer: MEDICAID

## 2017-09-20 VITALS
DIASTOLIC BLOOD PRESSURE: 80 MMHG | HEIGHT: 62 IN | WEIGHT: 144 LBS | SYSTOLIC BLOOD PRESSURE: 120 MMHG | BODY MASS INDEX: 26.5 KG/M2

## 2017-09-20 DIAGNOSIS — N76.0 ACUTE VAGINITIS: ICD-10-CM

## 2017-09-20 DIAGNOSIS — K21.0 GASTRO-ESOPHAGEAL REFLUX DISEASE WITH ESOPHAGITIS: ICD-10-CM

## 2017-09-20 DIAGNOSIS — Z30.09 ENCOUNTER FOR OTHER GENERAL COUNSELING AND ADVICE ON CONTRACEPTION: ICD-10-CM

## 2017-09-20 DIAGNOSIS — B37.3 CANDIDIASIS OF VULVA AND VAGINA: ICD-10-CM

## 2017-09-20 DIAGNOSIS — B96.89 ACUTE VAGINITIS: ICD-10-CM

## 2017-09-20 PROCEDURE — 58300 INSERT INTRAUTERINE DEVICE: CPT

## 2017-10-19 ENCOUNTER — ASOB RESULT (OUTPATIENT)
Age: 32
End: 2017-10-19

## 2017-10-19 ENCOUNTER — APPOINTMENT (OUTPATIENT)
Dept: OBGYN | Facility: CLINIC | Age: 32
End: 2017-10-19
Payer: MEDICAID

## 2017-10-19 PROCEDURE — 76830 TRANSVAGINAL US NON-OB: CPT

## 2018-02-06 ENCOUNTER — LABORATORY RESULT (OUTPATIENT)
Age: 33
End: 2018-02-06

## 2018-02-07 ENCOUNTER — APPOINTMENT (OUTPATIENT)
Dept: OBGYN | Facility: CLINIC | Age: 33
End: 2018-02-07
Payer: MEDICAID

## 2018-02-07 VITALS
BODY MASS INDEX: 26.13 KG/M2 | WEIGHT: 142 LBS | HEIGHT: 62 IN | DIASTOLIC BLOOD PRESSURE: 80 MMHG | SYSTOLIC BLOOD PRESSURE: 120 MMHG

## 2018-02-07 PROCEDURE — 99395 PREV VISIT EST AGE 18-39: CPT

## 2018-02-07 RX ORDER — METRONIDAZOLE 7.5 MG/G
0.75 GEL VAGINAL
Qty: 1 | Refills: 1 | Status: COMPLETED | COMMUNITY
Start: 2017-07-05 | End: 2018-02-07

## 2018-02-07 RX ORDER — TERCONAZOLE 8 MG/G
0.8 CREAM VAGINAL
Qty: 1 | Refills: 4 | Status: COMPLETED | COMMUNITY
Start: 2017-05-11 | End: 2018-02-07

## 2018-02-07 RX ORDER — SUCRALFATE 1 G/10ML
1 SUSPENSION ORAL 3 TIMES DAILY
Qty: 1 | Refills: 3 | Status: COMPLETED | COMMUNITY
Start: 2017-02-15 | End: 2018-02-07

## 2018-02-07 RX ORDER — PRENATAL VIT NO.130/IRON/FOLIC 27MG-0.8MG
27-0.8 TABLET ORAL DAILY
Qty: 60 | Refills: 3 | Status: COMPLETED | COMMUNITY
Start: 2017-02-15 | End: 2018-02-07

## 2018-02-07 RX ORDER — METHYLPREDNISOLONE 4 MG/1
4 TABLET ORAL
Qty: 1 | Refills: 0 | Status: COMPLETED | COMMUNITY
Start: 2017-06-01 | End: 2018-02-07

## 2018-02-07 RX ORDER — FLUCONAZOLE 150 MG/1
150 TABLET ORAL
Qty: 1 | Refills: 0 | Status: COMPLETED | COMMUNITY
Start: 2017-07-05 | End: 2018-02-07

## 2018-02-07 RX ORDER — FLUCONAZOLE 150 MG/1
150 TABLET ORAL
Qty: 1 | Refills: 0 | Status: COMPLETED | COMMUNITY
Start: 2017-05-11 | End: 2018-02-07

## 2018-02-12 ENCOUNTER — APPOINTMENT (OUTPATIENT)
Dept: OBGYN | Facility: CLINIC | Age: 33
End: 2018-02-12

## 2019-02-06 ENCOUNTER — LABORATORY RESULT (OUTPATIENT)
Age: 34
End: 2019-02-06

## 2019-02-07 ENCOUNTER — ASOB RESULT (OUTPATIENT)
Age: 34
End: 2019-02-07

## 2019-02-07 ENCOUNTER — APPOINTMENT (OUTPATIENT)
Dept: OBGYN | Facility: CLINIC | Age: 34
End: 2019-02-07
Payer: MEDICAID

## 2019-02-07 VITALS
SYSTOLIC BLOOD PRESSURE: 120 MMHG | HEIGHT: 62 IN | BODY MASS INDEX: 25.58 KG/M2 | WEIGHT: 139 LBS | DIASTOLIC BLOOD PRESSURE: 80 MMHG

## 2019-02-07 DIAGNOSIS — C44.91 BASAL CELL CARCINOMA OF SKIN, UNSPECIFIED: ICD-10-CM

## 2019-02-07 PROCEDURE — 76830 TRANSVAGINAL US NON-OB: CPT

## 2019-02-07 PROCEDURE — 99395 PREV VISIT EST AGE 18-39: CPT

## 2019-02-07 NOTE — PHYSICAL EXAM
[Awake] : awake [Alert] : alert [Soft] : soft [Oriented x3] : oriented to person, place, and time [Normal] : uterus [No Bleeding] : there was no active vaginal bleeding [Uterine Adnexae] : were not tender and not enlarged [Acute Distress] : no acute distress [Mass] : no breast mass [Nipple Discharge] : no nipple discharge [Axillary LAD] : no axillary lymphadenopathy [Tender] : non tender [de-identified] : enlarged labial minora

## 2019-03-14 ENCOUNTER — APPOINTMENT (OUTPATIENT)
Dept: OBGYN | Facility: CLINIC | Age: 34
End: 2019-03-14
Payer: MEDICAID

## 2019-03-14 DIAGNOSIS — N80.0 ENDOMETRIOSIS OF UTERUS: ICD-10-CM

## 2019-03-14 PROCEDURE — 99213 OFFICE O/P EST LOW 20 MIN: CPT

## 2019-03-14 NOTE — COUNSELING
[Breast Self Exam] : breast self exam [Nutrition] : nutrition [Exercise] : exercise [Vitamins/Supplements] : vitamins/supplements [STD (testing, results, tx)] : STD (testing, results, tx) [Pre/Post Op Instructions] : pre/post op instructions

## 2020-03-17 ENCOUNTER — LABORATORY RESULT (OUTPATIENT)
Age: 35
End: 2020-03-17

## 2020-03-18 ENCOUNTER — APPOINTMENT (OUTPATIENT)
Dept: OBGYN | Facility: CLINIC | Age: 35
End: 2020-03-18
Payer: MEDICAID

## 2020-03-18 ENCOUNTER — ASOB RESULT (OUTPATIENT)
Age: 35
End: 2020-03-18

## 2020-03-18 PROCEDURE — 99395 PREV VISIT EST AGE 18-39: CPT

## 2020-03-18 PROCEDURE — 76856 US EXAM PELVIC COMPLETE: CPT

## 2020-03-18 RX ORDER — NORETHINDRONE ACETATE AND ETHINYL ESTRADIOL 1; .02 MG/1; MG/1
1-20 TABLET ORAL
Qty: 1 | Refills: 11 | Status: COMPLETED | COMMUNITY
Start: 2019-03-14 | End: 2020-03-18

## 2020-03-18 NOTE — PHYSICAL EXAM
[Awake] : awake [Alert] : alert [Acute Distress] : no acute distress [LAD] : no lymphadenopathy [Thyroid Nodule] : no thyroid nodule [Goiter] : no goiter [Mass] : no breast mass [Nipple Discharge] : no nipple discharge [Axillary LAD] : no axillary lymphadenopathy [Soft] : soft [Tender] : non tender [Oriented x3] : oriented to person, place, and time [Normal] : uterus [No Bleeding] : there was no active vaginal bleeding [IUD String] : had an IUD string protruding out [Uterine Adnexae] : were not tender and not enlarged

## 2021-05-14 ENCOUNTER — APPOINTMENT (OUTPATIENT)
Dept: OBGYN | Facility: CLINIC | Age: 36
End: 2021-05-14

## 2021-08-02 ENCOUNTER — APPOINTMENT (OUTPATIENT)
Dept: OBGYN | Facility: CLINIC | Age: 36
End: 2021-08-02
Payer: MEDICAID

## 2021-08-02 ENCOUNTER — LABORATORY RESULT (OUTPATIENT)
Age: 36
End: 2021-08-02

## 2021-08-02 VITALS — DIASTOLIC BLOOD PRESSURE: 78 MMHG | WEIGHT: 132 LBS | SYSTOLIC BLOOD PRESSURE: 112 MMHG | BODY MASS INDEX: 24.14 KG/M2

## 2021-08-02 PROCEDURE — 99395 PREV VISIT EST AGE 18-39: CPT

## 2021-08-02 PROCEDURE — 99072 ADDL SUPL MATRL&STAF TM PHE: CPT

## 2021-08-27 ENCOUNTER — APPOINTMENT (OUTPATIENT)
Dept: OBGYN | Facility: CLINIC | Age: 36
End: 2021-08-27
Payer: MEDICAID

## 2021-08-27 ENCOUNTER — LABORATORY RESULT (OUTPATIENT)
Age: 36
End: 2021-08-27

## 2021-08-27 VITALS — DIASTOLIC BLOOD PRESSURE: 76 MMHG | WEIGHT: 129 LBS | SYSTOLIC BLOOD PRESSURE: 114 MMHG | BODY MASS INDEX: 23.59 KG/M2

## 2021-08-27 DIAGNOSIS — R87.810 ATYPICAL SQUAMOUS CELLS OF UNDETERMINED SIGNIFICANCE ON CYTOLOGIC SMEAR OF CERVIX (ASC-US): ICD-10-CM

## 2021-08-27 DIAGNOSIS — R87.610 ATYPICAL SQUAMOUS CELLS OF UNDETERMINED SIGNIFICANCE ON CYTOLOGIC SMEAR OF CERVIX (ASC-US): ICD-10-CM

## 2021-08-27 PROCEDURE — 99072 ADDL SUPL MATRL&STAF TM PHE: CPT

## 2021-08-27 PROCEDURE — 57455 BIOPSY OF CERVIX W/SCOPE: CPT

## 2021-08-27 NOTE — PROCEDURE
[Colposcopy] : Colposcopy  [Time out performed] : Pre-procedure time out performed.  Patient's name, date of birth and procedure confirmed. [Consent Obtained] : Consent obtained [Risks] : risks [Benefits] : benefits [Alternatives] : alternatives [Patient] : patient [Infection] : infection [Bleeding] : bleeding [Allergic Reaction] : allergic reaction [ASCUS] : ASCUS [HPV High Risk] : HPV high risk [No Abnormalities] : no abnormalities [Biopsy] : biopsy taken [Hemostasis Obtained] : Hemostasis obtained [Tolerated Well] : the patient tolerated the procedure well [de-identified] : 1 [de-identified] : 7 o'clock

## 2022-01-20 NOTE — PATIENT PROFILE OB - WEIGHT PRE-PREGNANCY IN KG
Per written message from Dr Conor Tirado on printed phone message,   Pt to have prednisone 40mg once daily for 5 days  Pt informed and verbalized understanding. No further questions at this time.   rx to pharm    62

## 2022-01-28 NOTE — PATIENT PROFILE OB - MEDICAL/SURG HX
Dx: S/P right rotator cuff repair (M35.808)             Insurance (Authorized # of Visits):   (8 visits authorized)       Authorizing Physician: Dr. Americo Shah  Next MD visit: none scheduled  Fall Risk: standard         Precautions: n/a             Subj For Medical Surgical Hx obtained at Admission, Please see Provider H&P be independent and compliant with comprehensive HEP to maintain progress achieved in PT       Plan: Resume full exercise list next visit. Progress shoulder ROM and strength as tolerated.    Date: 1/25/2022  TX#: 2/16 Date:1/28/2022                  TX#: 3/1

## 2022-02-10 ENCOUNTER — APPOINTMENT (OUTPATIENT)
Dept: OBGYN | Facility: CLINIC | Age: 37
End: 2022-02-10
Payer: MEDICAID

## 2022-02-10 VITALS
HEART RATE: 89 BPM | BODY MASS INDEX: 24.88 KG/M2 | WEIGHT: 136 LBS | DIASTOLIC BLOOD PRESSURE: 63 MMHG | OXYGEN SATURATION: 98 % | SYSTOLIC BLOOD PRESSURE: 110 MMHG

## 2022-02-10 DIAGNOSIS — N92.6 IRREGULAR MENSTRUATION, UNSPECIFIED: ICD-10-CM

## 2022-02-10 PROCEDURE — 99214 OFFICE O/P EST MOD 30 MIN: CPT

## 2022-02-10 PROCEDURE — 99072 ADDL SUPL MATRL&STAF TM PHE: CPT

## 2022-02-10 NOTE — HISTORY OF PRESENT ILLNESS
[FreeTextEntry1] : here for irreg menses for 3 months and pelvic pain\par has copper IUD for 4 years \par currently on spirolactone for acne

## 2022-08-25 ENCOUNTER — APPOINTMENT (OUTPATIENT)
Dept: OBGYN | Facility: CLINIC | Age: 37
End: 2022-08-25

## 2022-09-15 ENCOUNTER — APPOINTMENT (OUTPATIENT)
Dept: OBGYN | Facility: CLINIC | Age: 37
End: 2022-09-15

## 2022-09-16 LAB
BASOPHILS # BLD AUTO: 0.03 K/UL
BASOPHILS NFR BLD AUTO: 0.4 %
EOSINOPHIL # BLD AUTO: 0.22 K/UL
EOSINOPHIL NFR BLD AUTO: 2.7 %
HCT VFR BLD CALC: 40.4 %
HGB BLD-MCNC: 12.9 G/DL
IMM GRANULOCYTES NFR BLD AUTO: 0.2 %
LYMPHOCYTES # BLD AUTO: 1.72 K/UL
LYMPHOCYTES NFR BLD AUTO: 21.2 %
MAN DIFF?: NORMAL
MCHC RBC-ENTMCNC: 29.6 PG
MCHC RBC-ENTMCNC: 31.9 GM/DL
MCV RBC AUTO: 92.7 FL
MONOCYTES # BLD AUTO: 0.58 K/UL
MONOCYTES NFR BLD AUTO: 7.1 %
NEUTROPHILS # BLD AUTO: 5.55 K/UL
NEUTROPHILS NFR BLD AUTO: 68.4 %
PLATELET # BLD AUTO: 291 K/UL
RBC # BLD: 4.36 M/UL
RBC # FLD: 13.5 %
WBC # FLD AUTO: 8.12 K/UL

## 2022-09-17 LAB
FSH SERPL-MCNC: 1.1 IU/L
LH SERPL-ACNC: 1.4 IU/L
PROLACTIN SERPL-MCNC: 15.8 NG/ML
TSH SERPL-ACNC: 1.71 UIU/ML

## 2022-09-19 LAB — ESTROGEN SERPL-MCNC: 464 PG/ML

## 2022-09-22 LAB
TESTOST FREE SERPL-MCNC: 0.5 PG/ML
TESTOST SERPL-MCNC: 19.4 NG/DL

## 2022-10-03 ENCOUNTER — APPOINTMENT (OUTPATIENT)
Dept: OBGYN | Facility: CLINIC | Age: 37
End: 2022-10-03

## 2022-10-03 VITALS
WEIGHT: 136 LBS | BODY MASS INDEX: 24.88 KG/M2 | SYSTOLIC BLOOD PRESSURE: 114 MMHG | DIASTOLIC BLOOD PRESSURE: 72 MMHG | OXYGEN SATURATION: 99 % | HEART RATE: 73 BPM

## 2022-10-03 DIAGNOSIS — N93.9 ABNORMAL UTERINE AND VAGINAL BLEEDING, UNSPECIFIED: ICD-10-CM

## 2022-10-03 PROCEDURE — 99213 OFFICE O/P EST LOW 20 MIN: CPT

## 2022-10-03 NOTE — HISTORY OF PRESENT ILLNESS
[FreeTextEntry1] : patient is here for AUB\par she is having spotting between periods\par was on spirolactone and stopped 1 month ago \par hormonal panel wnl

## 2022-11-15 ENCOUNTER — LABORATORY RESULT (OUTPATIENT)
Age: 37
End: 2022-11-15

## 2022-11-15 ENCOUNTER — APPOINTMENT (OUTPATIENT)
Dept: OBGYN | Facility: CLINIC | Age: 37
End: 2022-11-15

## 2022-11-15 VITALS — BODY MASS INDEX: 25.42 KG/M2 | DIASTOLIC BLOOD PRESSURE: 69 MMHG | SYSTOLIC BLOOD PRESSURE: 114 MMHG | WEIGHT: 139 LBS

## 2022-11-15 PROCEDURE — 99395 PREV VISIT EST AGE 18-39: CPT

## 2022-11-15 NOTE — PHYSICAL EXAM
[Examination Of The Breasts] : a normal appearance [No Masses] : no breast masses were palpable [Labia Majora] : normal [Labia Minora] : normal [Normal] : normal [Uterine Adnexae] : normal [IUD String] : an IUD string was noted

## 2022-12-01 ENCOUNTER — APPOINTMENT (OUTPATIENT)
Dept: OBGYN | Facility: CLINIC | Age: 37
End: 2022-12-01

## 2022-12-05 ENCOUNTER — APPOINTMENT (OUTPATIENT)
Dept: OBGYN | Facility: CLINIC | Age: 37
End: 2022-12-05

## 2022-12-05 ENCOUNTER — ASOB RESULT (OUTPATIENT)
Age: 37
End: 2022-12-05

## 2022-12-05 PROCEDURE — 76830 TRANSVAGINAL US NON-OB: CPT

## 2024-01-23 ENCOUNTER — LABORATORY RESULT (OUTPATIENT)
Age: 39
End: 2024-01-23

## 2024-01-23 ENCOUNTER — APPOINTMENT (OUTPATIENT)
Dept: OBGYN | Facility: CLINIC | Age: 39
End: 2024-01-23
Payer: MEDICAID

## 2024-01-23 VITALS
WEIGHT: 139 LBS | BODY MASS INDEX: 25.42 KG/M2 | HEART RATE: 86 BPM | SYSTOLIC BLOOD PRESSURE: 118 MMHG | OXYGEN SATURATION: 100 % | DIASTOLIC BLOOD PRESSURE: 79 MMHG

## 2024-01-23 DIAGNOSIS — Z01.419 ENCOUNTER FOR GYNECOLOGICAL EXAMINATION (GENERAL) (ROUTINE) W/OUT ABNORMAL FINDINGS: ICD-10-CM

## 2024-01-23 PROCEDURE — 99395 PREV VISIT EST AGE 18-39: CPT

## 2024-01-23 RX ORDER — SPIRONOLACTONE 25 MG/1
25 TABLET ORAL DAILY
Qty: 30 | Refills: 11 | Status: ACTIVE | COMMUNITY
Start: 2024-01-23 | End: 1900-01-01

## 2024-02-08 ENCOUNTER — APPOINTMENT (OUTPATIENT)
Dept: OBGYN | Facility: CLINIC | Age: 39
End: 2024-02-08

## 2024-02-20 ENCOUNTER — APPOINTMENT (OUTPATIENT)
Dept: OBGYN | Facility: CLINIC | Age: 39
End: 2024-02-20

## 2024-06-05 ENCOUNTER — APPOINTMENT (OUTPATIENT)
Dept: OBGYN | Facility: CLINIC | Age: 39
End: 2024-06-05
Payer: COMMERCIAL

## 2024-06-05 VITALS — WEIGHT: 136 LBS | BODY MASS INDEX: 24.88 KG/M2 | SYSTOLIC BLOOD PRESSURE: 109 MMHG | DIASTOLIC BLOOD PRESSURE: 74 MMHG

## 2024-06-05 DIAGNOSIS — R10.2 PELVIC AND PERINEAL PAIN: ICD-10-CM

## 2024-06-05 PROCEDURE — 99212 OFFICE O/P EST SF 10 MIN: CPT

## 2024-06-05 RX ORDER — NORETHINDRONE ACETATE AND ETHINYL ESTRADIOL 1; 20 MG/1; UG/1
1-20 TABLET ORAL
Qty: 1 | Refills: 11 | Status: COMPLETED | OUTPATIENT
Start: 2022-10-03 | End: 2024-06-05

## 2024-06-05 RX ORDER — METRONIDAZOLE 500 MG/1
500 TABLET ORAL TWICE DAILY
Qty: 14 | Refills: 0 | Status: COMPLETED | COMMUNITY
Start: 2022-02-10 | End: 2024-06-05

## 2024-06-20 ENCOUNTER — APPOINTMENT (OUTPATIENT)
Dept: ULTRASOUND IMAGING | Facility: CLINIC | Age: 39
End: 2024-06-20
Payer: COMMERCIAL

## 2024-06-20 ENCOUNTER — RESULT REVIEW (OUTPATIENT)
Age: 39
End: 2024-06-20

## 2024-06-20 PROCEDURE — 76830 TRANSVAGINAL US NON-OB: CPT

## 2024-06-20 PROCEDURE — 76856 US EXAM PELVIC COMPLETE: CPT

## 2024-12-10 ENCOUNTER — APPOINTMENT (OUTPATIENT)
Dept: OBGYN | Facility: CLINIC | Age: 39
End: 2024-12-10
Payer: COMMERCIAL

## 2024-12-10 VITALS — WEIGHT: 134 LBS | BODY MASS INDEX: 24.51 KG/M2 | SYSTOLIC BLOOD PRESSURE: 102 MMHG | DIASTOLIC BLOOD PRESSURE: 68 MMHG

## 2024-12-10 DIAGNOSIS — R10.2 PELVIC AND PERINEAL PAIN: ICD-10-CM

## 2024-12-10 PROCEDURE — 99214 OFFICE O/P EST MOD 30 MIN: CPT

## 2024-12-10 RX ORDER — METRONIDAZOLE 500 MG/1
500 TABLET ORAL TWICE DAILY
Qty: 28 | Refills: 0 | Status: ACTIVE | COMMUNITY
Start: 2024-12-10 | End: 1900-01-01

## 2024-12-10 RX ORDER — DOXYCYCLINE HYCLATE 100 MG/1
100 CAPSULE ORAL
Qty: 28 | Refills: 0 | Status: ACTIVE | COMMUNITY
Start: 2024-12-10 | End: 1900-01-01

## 2025-05-16 ENCOUNTER — LABORATORY RESULT (OUTPATIENT)
Age: 40
End: 2025-05-16

## 2025-05-16 ENCOUNTER — APPOINTMENT (OUTPATIENT)
Dept: OBGYN | Facility: CLINIC | Age: 40
End: 2025-05-16

## 2025-05-16 ENCOUNTER — NON-APPOINTMENT (OUTPATIENT)
Age: 40
End: 2025-05-16

## 2025-05-16 VITALS
SYSTOLIC BLOOD PRESSURE: 105 MMHG | DIASTOLIC BLOOD PRESSURE: 68 MMHG | HEART RATE: 72 BPM | WEIGHT: 143 LBS | BODY MASS INDEX: 26.16 KG/M2 | OXYGEN SATURATION: 99 %

## 2025-05-16 DIAGNOSIS — Z01.419 ENCOUNTER FOR GYNECOLOGICAL EXAMINATION (GENERAL) (ROUTINE) W/OUT ABNORMAL FINDINGS: ICD-10-CM

## 2025-05-16 DIAGNOSIS — Z30.09 ENCOUNTER FOR OTHER GENERAL COUNSELING AND ADVICE ON CONTRACEPTION: ICD-10-CM

## 2025-05-16 PROCEDURE — 99395 PREV VISIT EST AGE 18-39: CPT
